# Patient Record
Sex: MALE | Race: WHITE
[De-identification: names, ages, dates, MRNs, and addresses within clinical notes are randomized per-mention and may not be internally consistent; named-entity substitution may affect disease eponyms.]

---

## 2021-12-20 ENCOUNTER — HOSPITAL ENCOUNTER (EMERGENCY)
Dept: HOSPITAL 75 - ER FS | Age: 81
Discharge: HOME | End: 2021-12-20
Payer: MEDICARE

## 2021-12-20 VITALS — BODY MASS INDEX: 23.28 KG/M2 | WEIGHT: 144.84 LBS | HEIGHT: 66.14 IN

## 2021-12-20 VITALS — DIASTOLIC BLOOD PRESSURE: 60 MMHG | SYSTOLIC BLOOD PRESSURE: 164 MMHG

## 2021-12-20 DIAGNOSIS — F03.90: ICD-10-CM

## 2021-12-20 DIAGNOSIS — Z72.0: ICD-10-CM

## 2021-12-20 DIAGNOSIS — I10: ICD-10-CM

## 2021-12-20 DIAGNOSIS — S06.0X9A: Primary | ICD-10-CM

## 2021-12-20 DIAGNOSIS — S00.83XA: ICD-10-CM
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
## 2021-12-20 DIAGNOSIS — Z79.01: ICD-10-CM

## 2021-12-20 DIAGNOSIS — Z23: ICD-10-CM

## 2021-12-20 LAB
ALBUMIN SERPL-MCNC: 4 GM/DL (ref 3.2–4.5)
ALP SERPL-CCNC: 84 U/L (ref 40–136)
ALT SERPL-CCNC: 10 U/L (ref 0–55)
APTT BLD: 25 SEC (ref 24–35)
BASOPHILS # BLD AUTO: 0.1 10^3/UL (ref 0–0.1)
BASOPHILS NFR BLD AUTO: 1 % (ref 0–10)
BILIRUB SERPL-MCNC: 0.4 MG/DL (ref 0.1–1)
BUN/CREAT SERPL: 12
CALCIUM SERPL-MCNC: 8.7 MG/DL (ref 8.5–10.1)
CHLORIDE SERPL-SCNC: 103 MMOL/L (ref 98–107)
CO2 SERPL-SCNC: 22 MMOL/L (ref 21–32)
CREAT SERPL-MCNC: 1.86 MG/DL (ref 0.6–1.3)
EOSINOPHIL # BLD AUTO: 0.1 10^3/UL (ref 0–0.3)
EOSINOPHIL NFR BLD AUTO: 2 % (ref 0–10)
GFR SERPLBLD BASED ON 1.73 SQ M-ARVRAT: 35 ML/MIN
GLUCOSE SERPL-MCNC: 185 MG/DL (ref 70–105)
HCT VFR BLD CALC: 45 % (ref 40–54)
HGB BLD-MCNC: 15.2 G/DL (ref 13.3–17.7)
INR PPP: 0.9 (ref 0.8–1.4)
LYMPHOCYTES # BLD AUTO: 1.3 X 10^3 (ref 1–4)
LYMPHOCYTES NFR BLD AUTO: 16 % (ref 12–44)
MANUAL DIFFERENTIAL PERFORMED BLD QL: NO
MCH RBC QN AUTO: 30 PG (ref 25–34)
MCHC RBC AUTO-ENTMCNC: 34 G/DL (ref 32–36)
MCV RBC AUTO: 89 FL (ref 80–99)
MONOCYTES # BLD AUTO: 0.6 X 10^3 (ref 0–1)
MONOCYTES NFR BLD AUTO: 7 % (ref 0–12)
NEUTROPHILS # BLD AUTO: 6.1 X 10^3 (ref 1.8–7.8)
NEUTROPHILS NFR BLD AUTO: 74 % (ref 42–75)
PLATELET # BLD: 143 10^3/UL (ref 130–400)
PMV BLD AUTO: 10.2 FL (ref 9–12.2)
POTASSIUM SERPL-SCNC: 4.7 MMOL/L (ref 3.6–5)
PROT SERPL-MCNC: 6.4 GM/DL (ref 6.4–8.2)
PROTHROMBIN TIME: 12.8 SEC (ref 12.2–14.7)
SODIUM SERPL-SCNC: 137 MMOL/L (ref 135–145)
WBC # BLD AUTO: 8.1 10^3/UL (ref 4.3–11)

## 2021-12-20 PROCEDURE — 70450 CT HEAD/BRAIN W/O DYE: CPT

## 2021-12-20 PROCEDURE — 72125 CT NECK SPINE W/O DYE: CPT

## 2021-12-20 PROCEDURE — 93005 ELECTROCARDIOGRAM TRACING: CPT

## 2021-12-20 PROCEDURE — 84484 ASSAY OF TROPONIN QUANT: CPT

## 2021-12-20 PROCEDURE — 80053 COMPREHEN METABOLIC PANEL: CPT

## 2021-12-20 PROCEDURE — 36415 COLL VENOUS BLD VENIPUNCTURE: CPT

## 2021-12-20 PROCEDURE — 85025 COMPLETE CBC W/AUTO DIFF WBC: CPT

## 2021-12-20 PROCEDURE — 90715 TDAP VACCINE 7 YRS/> IM: CPT

## 2021-12-20 PROCEDURE — 71045 X-RAY EXAM CHEST 1 VIEW: CPT

## 2021-12-20 PROCEDURE — 83880 ASSAY OF NATRIURETIC PEPTIDE: CPT

## 2021-12-20 PROCEDURE — 93041 RHYTHM ECG TRACING: CPT

## 2021-12-20 PROCEDURE — 85730 THROMBOPLASTIN TIME PARTIAL: CPT

## 2021-12-20 PROCEDURE — 85610 PROTHROMBIN TIME: CPT

## 2021-12-20 NOTE — DIAGNOSTIC IMAGING REPORT
PROCEDURE: CT head and CT cervical spine without contrast.



TECHNIQUE: Multiple contiguous axial images were obtained through

the brain and cervical spine without the use of intravenous

contrast. Sagittal and coronal reformations through the cervical

spine were then performed. Auto Exposure Controls were utilized

during the CT exam to meet ALARA standards for radiation dose

reduction. 



INDICATION: Fall with head and neck injury.



COMPARISON: No prior studies available for comparison.



FINDINGS:



CT HEAD:



There is hematoma in the right frontal scalp. Ventricles and

sulci are appropriate for the patient's age. No sulcal effacement

or midline shift is identified. No acute intra-axial or

extra-axial hemorrhage is detected. Cisterns are patent.

Visualized paranasal sinuses are clear.



IMPRESSION: Right frontal scalp hematoma. No acute intracranial

process is detected.



CT CERVICAL SPINE:



Curvature and alignment of the cervical spine is normal. There is

multilevel degenerative disc and facet disease. No fractures are

identified. Prevertebral tissues are within normal limits.

Odontoid is intact.



IMPRESSION: Cervical spondylosis. No acute bony abnormality is

detected.



Dictated by: 



  Dictated on workstation # [id number]

## 2021-12-20 NOTE — DIAGNOSTIC IMAGING REPORT
INDICATION: Fall and seizure.



TIME OF EXAM: 10:29 AM



No prior studies are available for comparison.



FINDINGS: The heart size is normal. The pulmonary vascularity is

unremarkable. The lungs are clear. No infiltrate, effusion or

pneumothorax is detected.



IMPRESSION: No acute cardiopulmonary process is detected.



Dictated by: 



  Dictated on workstation # [id number]

## 2021-12-20 NOTE — ED FALL/INJURY
General


Stated Complaint:  SEIZURE; FALL


Source:  patient, EMS


Exam Limitations:  no limitations





History of Present Illness


Date Seen by Provider:  Dec 20, 2021


Time Seen by Provider:  10:00


Initial Comments


81-year-old male with past medical history of CAD with stenting, HLD, dementia, 

CKD, and hypertension [place] after the patient 

had a witnessed fall hitting his head and had a seizure like episode afterwards 

lasting roughly 20 to 30 seconds.  Upon EMS arrival, he was alert and oriented 

and was not postictal just shortly after his seizure-like episode ended.  His 

glucose was 160.  Blood pressure was around 220 systolic.  He did take his blood

pressure medications this morning.  He denies ever having a seizure in the past.

 EMS reports significant hematoma to the right side of his head with a small 

abrasion as well that is hemostatic.  He was complaining of a mild constant 

throbbing headache as well as neck pain so EMS placed a c-collar.  The patient 

denied any chest pain prior to the event, palpitations, or any preceding 

symptoms.  He says he was standing up and the next thing he knew he woke up on 

the ground.  He denies episodes like this in the past.  He is otherwise denying 

any other acute complaints.





Wife came later and added to the history that he takes Plavix.





Allergies and Home Medications


Allergies


Coded Allergies:  


     amoxicillin (Verified  Allergy, Unknown, 21)


     clavulanic acid (Verified  Allergy, Unknown, 21)


     lisinopril (Verified  Allergy, Unknown, 21)


     niacin (Verified  Allergy, Unknown, 21)


     procaine (Verified  Allergy, Unknown, 21)


     morphine (Verified  Adverse Reaction, Unknown, 21)





Patient Home Medication List


Home Medication List Reviewed:  Yes





Review of Systems


Review of Systems


Constitutional:  No chills, No fever


Eyes:  Denies Blurred Vision


Ears, Nose, Mouth, Throat:  no symptoms reported


Respiratory:  No cough, No short of breath


Cardiovascular:  No chest pain


Gastrointestinal:  No abdominal pain, No constipation, No nausea, No vomiting


Genitourinary:  no symptoms reported


Musculoskeletal:  no symptoms reported


Skin:  no symptoms reported


Psychiatric/Neurological:  Headache





All Other Systems Reviewed


Negative Unless Noted:  Yes





Past Medical-Social-Family Hx


Patient Social History


Tobacco Use?:  Yes


Alcohol Use?:  No





Past Medical History


Surgeries:  Yes


Orthopedic (shoulder surger)





Physical Exam


Vital Signs





Vital Signs - First Documented








 21





 09:57


 


Temp 36.6


 


Pulse 52


 


Resp 16


 


B/P (MAP) 190/64 (106)


 


Pulse Ox 99


 


O2 Delivery Room Air





Capillary Refill :


Height, Weight, BMI


Height: '"


Weight: lbs. oz. kg;  BMI


Method:


General Appearance:  WD/WN, no apparent distress


HEENT:  PERRL/EOMI, normal ENT inspection, pharynx normal, other (hematoma to 

the right temple with small abrasion over it)


Neck:  non-tender, supple, normal inspection, other (c collar in place)


Cardiovascular:  regular rate, rhythm, no edema, no murmur


Respiratory:  chest non-tender, lungs clear, normal breath sounds, no 

respiratory distress, no accessory muscle use


Gastrointestinal:  normal bowel sounds, non tender, soft; No distended, No 

guarding, No rebound


Back:  normal inspection, no CVA tenderness, no vertebral tenderness


Extremities:  normal range of motion, non-tender, normal inspection, no pedal 

edema, no calf tenderness, normal capillary refill


Neurologic/Psychiatric:  CNs II-XII nml as tested, no motor/sensory deficits, 

alert, normal mood/affect, oriented x 3


Skin:  normal color, warm/dry


Lymphatic:  no adenopathy





[place] Coma Score


Best Eye Response:  (4) Open Spontaneously


Best Verbal Response:  (5) Oriented


Best Motor Response:  (6) Obeys Commands





Progress/Results/Core Measures


Results/Orders


Lab Results





Laboratory Tests








Test


 21


10:08 Range/Units


 


 


White Blood Count


 8.1 


 4.3-11.0


10^3/uL


 


Red Blood Count


 5.12 


 4.30-5.52


10^6/uL


 


Hemoglobin 15.2  13.3-17.7  g/dL


 


Hematocrit 45  40-54  %


 


Mean Corpuscular Volume 89  80-99  fL


 


Mean Corpuscular Hemoglobin 30  25-34  pg


 


Mean Corpuscular Hemoglobin


Concent 34 


 32-36  g/dL





 


Red Cell Distribution Width 12.5  10.0-14.5  %


 


Platelet Count


 143 


 130-400


10^3/uL


 


Mean Platelet Volume 10.2  9.0-12.2  fL


 


Neutrophils (%) (Auto) 74  42-75  %


 


Lymphocytes (%) (Auto) 16  12-44  %


 


Monocytes (%) (Auto) 7  0-12  %


 


Eosinophils (%) (Auto) 2  0-10  %


 


Basophils (%) (Auto) 1  0-10  %


 


Neutrophils # (Auto) 6.1  1.8-7.8  X 10^3


 


Lymphocytes # (Auto) 1.3  1.0-4.0  X 10^3


 


Monocytes # (Auto) 0.6  0.0-1.0  X 10^3


 


Eosinophils # (Auto)


 0.1 


 0.0-0.3


10^3/uL


 


Basophils # (Auto)


 0.1 


 0.0-0.1


10^3/uL


 


Prothrombin Time 12.8  12.2-14.7  SEC


 


INR Comment 0.9  0.8-1.4  


 


Activated Partial


Thromboplast Time 25 


 24-35  SEC





 


Sodium Level 137  135-145  MMOL/L


 


Potassium Level 4.7  3.6-5.0  MMOL/L


 


Chloride Level 103    MMOL/L


 


Carbon Dioxide Level 22  21-32  MMOL/L


 


Anion Gap 12  5-14  MMOL/L


 


Blood Urea Nitrogen 22 H 7-18  MG/DL


 


Creatinine


 1.86 H


 0.60-1.30


MG/DL


 


Estimat Glomerular Filtration


Rate 35 


  





 


BUN/Creatinine Ratio 12   


 


Glucose Level 185 H   MG/DL


 


Calcium Level 8.7  8.5-10.1  MG/DL


 


Corrected Calcium 8.7  8.5-10.1  MG/DL


 


Total Bilirubin 0.4  0.1-1.0  MG/DL


 


Aspartate Amino Transf


(AST/SGOT) 16 


 5-34  U/L





 


Alanine Aminotransferase


(ALT/SGPT) 10 


 0-55  U/L





 


Alkaline Phosphatase 84    U/L


 


Troponin I < 0.30  <0.30  NG/ML


 


Pro-B-Type Natriuretic Peptide 833.6 H <75.0  PG/ML


 


Total Protein 6.4  6.4-8.2  GM/DL


 


Albumin 4.0  3.2-4.5  GM/DL








My Orders





Orders - FREDIS LONG MD


Ct Head/Cervical Spine Wo (21 10:04)


Cbc With Automated Diff (21 10:04)


Comprehensive Metabolic Panel (21 10:04)


Protime With Inr (21 10:04)


Partial Thromboplastin Time (21 10:04)


Ua Culture If Indicated (21 10:04)


Probnp Fs (21 10:04)


Troponin I Fs (21 10:04)


Ed Iv/Invasive Line Start (21 10:04)


Ekg Tracing (21 10:04)


Monitor-Rhythm Ecg Trace Only (21 10:04)


Chest 1 View Ap/Pa Only (21 10:04)


Acetaminophen  Tablet (Tylenol  Tablet) (21 10:15)


Dipht,Pertuss(Acell),Tet Adult (Boostrix (21 10:15)





Medications Given in ED





Current Medications








 Medications  Dose


 Ordered  Sig/Ryan


 Route  Start Time


 Stop Time Status Last Admin


Dose Admin


 


 Acetaminophen  1,000 mg  ONCE  ONCE


 PO  21 10:15


 21 10:16 DC 21 10:35


1,000 MG


 


 Diphtheria/


 Tetanus/Acell


 Pertussis  0.5 ml  ONCE ONCE


 IM  21 10:15


 21 10:16 DC 21 10:40


0.5 ML








Vital Signs/I&O











 21





 09:57


 


Temp 36.6


 


Pulse 52


 


Resp 16


 


B/P (MAP) 190/64 (106)


 


Pulse Ox 99


 


O2 Delivery Room Air











Progress


Progress Note :  


Progress Note


81-year-old male with above history coming in after a fall from questionable 

syncope versus some other etiology with questionable seizure-like activity after

wards but was not postictal afterwards.  GCS 15 on arrival here, glucose was 

just 160.  Vitals show that he is hypertensive but not as bad as he was for EMS 

with first pressure here 190/64.  Heart rate is sinus in the 50s with a left 

bundle branch block.





CT head and cervical spine negative for any acute abnormalities.  Chest x-ray 

also clear.  Electrolytes normal, kidney function is his baseline per his wife. 

She says his GFR is typically around 30 which she is in the 30s today.  Most 

importantly, the patient continues to be at his baseline and has been since his 

entire stay in the emergency department.  I have a very low suspicion that he 

actually had a seizure, and likely had some myoclonic jerks after he hit his 

head hard.  So could have been a syncopal episode and the only abnormal thing of

his heart rate is a little low, which once again his wife says is not unusual 

for him.  I recommended he follow-up with his cardiologist in [place] 

rapidly due to the bradycardia, and they may want to place a pacemaker at some 

point because he probably has sick sinus syndrome.  The lowest his heart rate 

got was around 48 in the ED, but most of the time he was in the 50s.  This 

entire time he was actually slightly hypertensive and he was never symptomatic 

with his bradycardia, so I believe he is stable for discharge with outpatient 

follow-up.  He was sent home with strict return precautions.


Initial ECG Impression Date:  Dec 20, 2021


Initial ECG Impression Time:  10:05


Initial ECG Rate:  53


Initial ECG Rhythm:  S.Landen


Comment


Wide QRS with a left bundle branch block, normal axis, no STEMI by [place] 

criteria





Diagnostic Imaging





   Diagonstic Imaging:  Xray (chest), CT (head and cspine)


Comments


NAME:   GURPREET ROBB


MED REC#:   [id number]


ACCOUNT#:   [id number]


PT STATUS:   REG ER


:   1940


PHYSICIAN:   FREDIS LONG MD


ADMIT DATE:   21/ER FS


                                   ***Draft***


Date of Exam:21





CHEST 1 VIEW AP/PA ONLY








INDICATION: Fall and seizure.





TIME OF EXAM: 10:29 AM





No prior studies are available for comparison.





FINDINGS: The heart size is normal. The pulmonary vascularity is


unremarkable. The lungs are clear. No infiltrate, effusion or


pneumothorax is detected.





IMPRESSION: No acute cardiopulmonary process is detected.





  Dictated on workstation # [id number]








Dict:   21 1036


Trans:   21 1037


CV [id number]





Interpreted by:     MORGAN SMITH MD


Electronically signed by:





Departure


Impression





   Primary Impression:  


   Syncope


   Qualified Codes:  R55 - Syncope and collapse


   Additional Impression:  


   Concussion


   Qualified Codes:  S06.0X1A - Concussion with loss of consciousness of 30 

   minutes or less, initial encounter


Disposition:  01 HOME, SELF-CARE


Condition:  Stable





Departure-Patient Inst.


Decision time for Depature:  11:11


Patient Instructions:  Concussion, Adult (DC), Fainting, Adult ED





Add. Discharge Instructions:  


You are seen in the emergency department after you passed out and hit your head.

 The CT of your head and cervical spine were normal without any findings that 

were concerning.  Your labs were also normal, and your kidney function is at 

your baseline.  Your cardiac marker (troponin) was normal.  Please follow-up 

with your cardiologist in [place] given your heart rate is low to see if 

they want to do anything about this.  If you develop any chest pain, shortness 

of breath, severe headache, begin getting very confused, or you have any other 

concerns and please call your doctor or come back to the ER.











FREDIS LONG MD          Dec 20, 2021 10:04